# Patient Record
Sex: FEMALE
[De-identification: names, ages, dates, MRNs, and addresses within clinical notes are randomized per-mention and may not be internally consistent; named-entity substitution may affect disease eponyms.]

---

## 2017-06-23 ENCOUNTER — RESULT REVIEW (OUTPATIENT)
Age: 67
End: 2017-06-23

## 2017-10-11 ENCOUNTER — RX RENEWAL (OUTPATIENT)
Age: 67
End: 2017-10-11

## 2017-10-13 ENCOUNTER — MEDICATION RENEWAL (OUTPATIENT)
Age: 67
End: 2017-10-13

## 2017-11-08 ENCOUNTER — RX RENEWAL (OUTPATIENT)
Age: 67
End: 2017-11-08

## 2017-11-14 ENCOUNTER — APPOINTMENT (OUTPATIENT)
Dept: CARDIOLOGY | Facility: CLINIC | Age: 67
End: 2017-11-14

## 2018-01-22 ENCOUNTER — RX RENEWAL (OUTPATIENT)
Age: 68
End: 2018-01-22

## 2019-08-29 ENCOUNTER — RECORD ABSTRACTING (OUTPATIENT)
Age: 69
End: 2019-08-29

## 2019-08-29 DIAGNOSIS — Z82.62 FAMILY HISTORY OF OSTEOPOROSIS: ICD-10-CM

## 2019-08-29 DIAGNOSIS — Z87.39 PERSONAL HISTORY OF OTHER DISEASES OF THE MUSCULOSKELETAL SYSTEM AND CONNECTIVE TISSUE: ICD-10-CM

## 2019-08-29 DIAGNOSIS — Z83.3 FAMILY HISTORY OF DIABETES MELLITUS: ICD-10-CM

## 2019-08-29 DIAGNOSIS — Z92.89 PERSONAL HISTORY OF OTHER MEDICAL TREATMENT: ICD-10-CM

## 2019-08-29 DIAGNOSIS — N95.1 MENOPAUSAL AND FEMALE CLIMACTERIC STATES: ICD-10-CM

## 2019-08-29 DIAGNOSIS — Z82.49 FAMILY HISTORY OF ISCHEMIC HEART DISEASE AND OTHER DISEASES OF THE CIRCULATORY SYSTEM: ICD-10-CM

## 2019-08-29 DIAGNOSIS — Z80.9 FAMILY HISTORY OF MALIGNANT NEOPLASM, UNSPECIFIED: ICD-10-CM

## 2019-08-29 DIAGNOSIS — N60.19 DIFFUSE CYSTIC MASTOPATHY OF UNSPECIFIED BREAST: ICD-10-CM

## 2019-08-29 DIAGNOSIS — F32.9 MAJOR DEPRESSIVE DISORDER, SINGLE EPISODE, UNSPECIFIED: ICD-10-CM

## 2019-08-29 DIAGNOSIS — M79.18 MYALGIA, OTHER SITE: ICD-10-CM

## 2019-08-29 DIAGNOSIS — Z80.49 FAMILY HISTORY OF MALIGNANT NEOPLASM OF OTHER GENITAL ORGANS: ICD-10-CM

## 2019-08-29 DIAGNOSIS — Z80.41 FAMILY HISTORY OF MALIGNANT NEOPLASM OF OVARY: ICD-10-CM

## 2019-08-29 DIAGNOSIS — F41.9 ANXIETY DISORDER, UNSPECIFIED: ICD-10-CM

## 2019-08-29 LAB — CYTOLOGY CVX/VAG DOC THIN PREP: NORMAL

## 2019-09-06 ENCOUNTER — APPOINTMENT (OUTPATIENT)
Dept: OBGYN | Facility: CLINIC | Age: 69
End: 2019-09-06

## 2019-09-16 ENCOUNTER — APPOINTMENT (OUTPATIENT)
Dept: OBGYN | Facility: CLINIC | Age: 69
End: 2019-09-16
Payer: COMMERCIAL

## 2019-09-16 VITALS
DIASTOLIC BLOOD PRESSURE: 68 MMHG | BODY MASS INDEX: 17.89 KG/M2 | HEIGHT: 67 IN | WEIGHT: 114 LBS | SYSTOLIC BLOOD PRESSURE: 120 MMHG

## 2019-09-16 DIAGNOSIS — Z78.9 OTHER SPECIFIED HEALTH STATUS: ICD-10-CM

## 2019-09-16 DIAGNOSIS — Z01.419 ENCOUNTER FOR GYNECOLOGICAL EXAMINATION (GENERAL) (ROUTINE) W/OUT ABNORMAL FINDINGS: ICD-10-CM

## 2019-09-16 PROCEDURE — 99397 PER PM REEVAL EST PAT 65+ YR: CPT

## 2019-09-16 RX ORDER — ALPRAZOLAM 0.5 MG/1
0.5 TABLET, EXTENDED RELEASE ORAL
Refills: 0 | Status: DISCONTINUED | COMMUNITY
End: 2019-09-16

## 2019-09-16 RX ORDER — ROSUVASTATIN CALCIUM 20 MG/1
20 TABLET, FILM COATED ORAL
Refills: 0 | Status: DISCONTINUED | COMMUNITY
End: 2019-09-16

## 2019-09-16 RX ORDER — ALPRAZOLAM 1 MG/1
1 TABLET ORAL
Refills: 0 | Status: DISCONTINUED | COMMUNITY
End: 2019-09-16

## 2019-09-16 NOTE — HISTORY OF PRESENT ILLNESS
[Last Mammogram ___] : Last Mammogram was [unfilled] [Last Bone Density ___] : Last bone density studies [unfilled] [Last Pap ___] : Last cervical pap smear was [unfilled] [Sexually Active] : is sexually active [Male ___] : [unfilled] male

## 2019-09-17 LAB — HPV HIGH+LOW RISK DNA PNL CVX: NOT DETECTED

## 2019-09-22 LAB — CYTOLOGY CVX/VAG DOC THIN PREP: ABNORMAL

## 2020-10-26 ENCOUNTER — APPOINTMENT (OUTPATIENT)
Dept: OBGYN | Facility: CLINIC | Age: 70
End: 2020-10-26
Payer: COMMERCIAL

## 2020-10-26 VITALS
SYSTOLIC BLOOD PRESSURE: 120 MMHG | TEMPERATURE: 98.6 F | DIASTOLIC BLOOD PRESSURE: 70 MMHG | BODY MASS INDEX: 18.36 KG/M2 | HEIGHT: 67 IN | WEIGHT: 117 LBS

## 2020-10-26 DIAGNOSIS — I10 ESSENTIAL (PRIMARY) HYPERTENSION: ICD-10-CM

## 2020-10-26 DIAGNOSIS — N95.2 POSTMENOPAUSAL ATROPHIC VAGINITIS: ICD-10-CM

## 2020-10-26 PROCEDURE — 82270 OCCULT BLOOD FECES: CPT

## 2020-10-26 PROCEDURE — 99072 ADDL SUPL MATRL&STAF TM PHE: CPT

## 2020-10-26 PROCEDURE — 99397 PER PM REEVAL EST PAT 65+ YR: CPT

## 2020-11-02 NOTE — PHYSICAL EXAM
[Appropriately responsive] : appropriately responsive [Alert] : alert [No Acute Distress] : no acute distress [No Lymphadenopathy] : no lymphadenopathy [Regular Rate Rhythm] : regular rate rhythm [No Murmurs] : no murmurs [Clear to Auscultation B/L] : clear to auscultation bilaterally [Soft] : soft [Non-tender] : non-tender [Non-distended] : non-distended [No HSM] : No HSM [No Lesions] : no lesions [No Mass] : no mass [Oriented x3] : oriented x3 [Examination Of The Breasts] : a normal appearance [No Masses] : no breast masses were palpable [Labia Majora] : normal [Labia Minora] : normal [Atrophy] : atrophy [Dry Mucosa] : dry mucosa [Normal] : normal [Uterine Adnexae] : normal [No Tenderness] : no tenderness [Nl Sphincter Tone] : normal sphincter tone [FreeTextEntry9] : Heme negative

## 2020-11-02 NOTE — PLAN
[FreeTextEntry1] : During this visit comprehensive counseling was given regarding the following concerns:\par \par 1 We discussed the need for proper nutrition and exercise.  This includes cardiovascular and pelvic floor exercise.\par \par 2 We discussed the importance of maintaining a proper vaccination schedule and we discussed the utility of the flu vaccine and TDaP.\par \par 3 We discussed the impact of chronic sun exposure and the risk for skin disease as a result. The benefits of a total body scan by a Dermatologist was reviewed..\par \par 4 We discussed the need for certain supplements for most people which include vitamin D3, calcium rich foods with limitation on calcium supplementation by tabular form to 600mg by mouth daily.  As part of a bone health program we recommend weightbearing exercises, and some limited sun exposure ( 10-15 minutes daily) to help convert vitamin D to its active form.\par

## 2020-12-07 LAB
CYTOLOGY CVX/VAG DOC THIN PREP: ABNORMAL
DATE COLLECTED: NORMAL
HEMOCCULT SP1 STL QL: NEGATIVE
QUALITY CONTROL: YES

## 2020-12-21 PROBLEM — Z01.419 ENCOUNTER FOR GYNECOLOGICAL EXAMINATION (GENERAL) (ROUTINE) WITHOUT ABNORMAL FINDINGS: Status: RESOLVED | Noted: 2019-09-16 | Resolved: 2020-12-21

## 2021-05-12 NOTE — HISTORY OF PRESENT ILLNESS
[facility]  [facility] [place] 1944[facility] 65 [facility]  [facility] MRN: [id number]  Unit/Bed#: W -01 Encounter: [id number]  Primary Care Provider: Carmenza Jackson   Date and time admitted to hospital: 5/10/2021  4:49 PM    * Right lower quadrant abdominal pain  Assessment & Plan  · Presentation: worsening RLQ abdominal pain with radiation to right side  Recently completed course of Levaquin and Flagyl for suspected diverticulitis  Pain worsens with PO intake  Also reporting bloating, loose/light colored stools  Denies bleeding  Some occasional nausea  · Unclear etiology, suspected remnants from improving recent diverticulitis  · CT abdomen pelvis without contrast: diverticulosis without evidence of diverticulitis  Asymmetric soft tissue thickening at the right lower anterior abdominal wall, stable since at least 2017  · UA negative  · Lipase normal   · Lactic normal  · Prior colonoscopy is not available for review  · Currently feels much improved  · Tolerating clear liquid diet  · Requested diet advancement for lunch with hopes of discharge   · Patient tolerated lunch without nausea, vomiting, diarrhea or pain   · Reports improvement in abdominal bloating with Simethicone x1   · Will discharge home with PPI BID and Simethicone PRN  · Remain well hydrated and follow a lite/ bland diet     Hypertension  Assessment & Plan  · BP elevated in setting of acute pain  · Resumed lisinopril  · Follow-up with PCP    PUD (peptic ulcer disease)  Assessment & Plan  · S/p EGD 07/2020 in setting of coffee ground emesis  Negative for H pylori  Treated with PPI BID and Carafate QID  Per note, patient recommended for repeat EGD around Oct 2020  · Remains on protonix BID  · May follow as outpatient  [facility]  Assessment & Plan  Lab Results   Component Value Date    HGBA1C 7 3 (H) 05/10/2021       No results for input(s): POCGLU in the last 72 hours      Blood [Menarche Age: ____] : age at menarche was [unfilled] Sugar Average: Last 72 hrs:  · Resume Metformin on discharge  · A1c 7 3      Degenerative lumbar spinal stenosis  Assessment & Plan  · Percocet  mg Q6 PRN, PDMP reviewed  · Substituted with oxycodone 5 mg q6 PRN moderate, oxycodone 10 mg q6 PRN severe as home regimen is nonformulary  Diarrhea-resolved as of 5/12/2021  Assessment & Plan  · POA   · No further diarrhea since admission, unable to check cdiff and bacterial stool panel        Discharging Physician / Practitioner: NANY Tucker  PCP: Deisy Toribio [address] [place] [address]  Admission Date:   Admission Orders (From admission, onward)     Ordered        05/10/21 4886  Place in Observation  Once                   Discharge Date: 05/12/21    Resolved Problems  Date Reviewed: 5/12/2021          Resolved    Diarrhea 5/12/2021     Resolved by  Luda George [address] [place] [address] Stay:  · None    Procedures Performed:   · CT A/P: No acute findings in the abdomen and pelvis  Significant Findings / Test Results:   · None     Incidental Findings:   · None     Test Results Pending at Discharge (will require follow up): · None      Outpatient Tests Requested:  · None    Complications:  None    Reason for Admission: Abdominal pain     Hospital Course: Maty Joseph is a 65 y o  female patient with a PMH including cholecystectomy, partial hysterectomy, appendectomy, PUD, DM, HTN, DJD who originally presented to the hospital on 5/10/2021 due to RLQ abdominal pain  Patient recently treated for right-sided diverticulitis and had completed her antibiotic course  States right lower quadrant pain persisted which prompted her to come to the emergency department  CT of the abdomen pelvis showed no acute finding  Patient was admitted for further evaluation and treatment  Unclear etiology of abdominal pain but suspect remnants from improving recent diverticulitis  No signs of active infection    Patient was treated with conservative [Currently Active] : currently active [Men] : men management with improvement  She tolerated clear liquids for which her diet was advanced to surgical soft  Patient ate 100% of lunch without nausea, vomiting, or diarrhea  Patient reported abdominal bloating which improved after simethicone  Patient desires discharge home  We will continue Reglan and simethicone as needed  She is to remain well hydrated follow-up bland diet  She is to follow-up with her PCP  Please see above list of diagnoses and related plan for additional information  Condition at Discharge: stable     Discharge Day Visit / Exam:     Subjective:  Patient tolerated clear liquids for breakfast and requested diet advancement for lunch  Patient ate 100% of lunch  No nausea, vomiting, or diarrhea  No chest pain or shortness of breath  Felt improvement in bloating after Simethicone  Would like to discharge home with Simethicone  Vitals: Blood Pressure: 169/76 (05/12/21 0727)  Pulse: 98 (05/12/21 0727)  Temperature: (!) 97 4 °F (36 3 °C) (05/12/21 0727)  Temp Source: Oral (05/12/21 0727)  Respirations: 17 (05/12/21 0727)  Height: 5' (152 4 cm) (05/11/21 1031)  Weight - Scale: 84 6 kg (186 lb 8 2 oz) (05/11/21 1031)  SpO2: 98 % (05/12/21 0727)  Exam:   Physical Exam  Vitals signs and nursing note reviewed  Constitutional:       General: She is not in acute distress  Appearance: She is well-developed  She is obese  She is not toxic-appearing or diaphoretic  HENT:      Head: Normocephalic and atraumatic  Eyes:      Conjunctiva/sclera: Conjunctivae normal    Neck:      Musculoskeletal: Normal range of motion  Cardiovascular:      Rate and Rhythm: Normal rate  Pulmonary:      Effort: Pulmonary effort is normal  No respiratory distress  Breath sounds: Normal breath sounds  No wheezing, rhonchi or rales  Abdominal:      General: Bowel sounds are normal  There is no distension  Palpations: Abdomen is soft  Tenderness: There is no abdominal tenderness   There is no [Vaginal] : vaginal right CVA tenderness, left CVA tenderness, guarding or rebound  Musculoskeletal: Normal range of motion  Right lower leg: No edema  Left lower leg: No edema  Skin:     General: Skin is warm and dry  Capillary Refill: Capillary refill takes less than 2 seconds  Neurological:      Mental Status: She is alert and oriented to person, place, and time  Mental status is at baseline  Psychiatric:         Mood and Affect: Mood normal          Behavior: Behavior normal          Thought Content: Thought content normal          Judgment: Judgment normal          Discussion with Family:  Offered to call family but patient declined this stating she called them    Discharge instructions/Information to patient and family:   See after visit summary for information provided to patient and family  Provisions for Follow-Up Care:  See after visit summary for information related to follow-up care and any pertinent home health orders  Disposition:     Home    For Discharges to [facility]F:   · Not Applicable to this Patient - Not Applicable to this Patient    Planned Readmission: None     Discharge Statement:  I spent > 30 minutes discharging the patient  This time was spent on the day of discharge  I had direct contact with the patient on the day of discharge  Greater than 50% of the total time was spent examining patient, answering all patient questions, arranging and discussing plan of care with patient as well as directly providing post-discharge instructions  Additional time then spent on discharge activities  Discharge Medications:  See after visit summary for reconciled discharge medications provided to patient and family        ** Please Note: This note has been constructed using a voice recognition system ** [No] : No [LMP unknown] : LMP unknown [postmenopausal] : postmenopausal [N] : Patient does not use contraception [Y] : Patient is sexually active [FreeTextEntry1] : Vonda is here her annual exam and she had her flu, pneumonia and shingles vaccine at her PCP. She is followed for her Osteoporosis and she is receiving Prolia infusions.  [Mammogramdate] : 9/23/2019  [TextBox_19] : BR1  [BreastSonogramDate] : 9/23/2019  [TextBox_25] : BR1  [PapSmeardate] : 9/16/2019  [TextBox_31] : WNL  [BoneDensityDate] : 8/2020  [TextBox_37] : @ dr harrell  [ColonoscopyDate] : 1/2020  [PGHxTotal] : 3 [[facility] : [facility] [[facility]] : 3 [TextBox_9] : 13

## 2021-11-08 ENCOUNTER — APPOINTMENT (OUTPATIENT)
Dept: OBGYN | Facility: CLINIC | Age: 71
End: 2021-11-08
Payer: COMMERCIAL

## 2021-11-08 ENCOUNTER — NON-APPOINTMENT (OUTPATIENT)
Age: 71
End: 2021-11-08

## 2021-11-08 VITALS
DIASTOLIC BLOOD PRESSURE: 72 MMHG | SYSTOLIC BLOOD PRESSURE: 132 MMHG | HEIGHT: 67 IN | WEIGHT: 107 LBS | BODY MASS INDEX: 16.79 KG/M2

## 2021-11-08 PROCEDURE — 82270 OCCULT BLOOD FECES: CPT

## 2021-11-08 PROCEDURE — 99397 PER PM REEVAL EST PAT 65+ YR: CPT

## 2021-11-08 RX ORDER — CARBAMAZEPINE 200 MG/1
200 TABLET ORAL
Refills: 0 | Status: DISCONTINUED | COMMUNITY
End: 2021-11-08

## 2021-12-05 LAB
DATE COLLECTED: NORMAL
HEMOCCULT SP1 STL QL: NEGATIVE
QUALITY CONTROL: YES

## 2021-12-05 NOTE — HISTORY OF PRESENT ILLNESS
[Patient reported PAP Smear was normal] : Patient reported PAP Smear was normal [HPV test offered] : HPV test offered [LMP unknown] : LMP unknown [postmenopausal] : postmenopausal [N] : Patient does not use contraception [Monogamous (Male Partner)] : is monogamous with a male partner [Y] : Positive pregnancy history [unknown] : Patient is unsure of the date of her LMP [Menarche Age: ____] : age at menarche was [unfilled] [Post-Menopause, No Sxs] : post-menopausal, currently without symptoms [Currently Active] : currently active [Men] : men [Vaginal] : vaginal [No] : No [Patient refuses STI testing] : Patient refuses STI testing [FreeTextEntry1] : GABY presents today for an annual exam. She is doing well with no gynecological complaints. [Mammogramdate] : 11.18.20 [TextBox_19] : BR1 [BreastSonogramDate] : 09.23.19 [TextBox_25] : BR1 [PapSmeardate] : 10.26.20 [BoneDensityDate] : 08.2020 [TextBox_37] : AS PER PATIENT  [ColonoscopyDate] : 01/2020 [TextBox_43] : AS PER PATIENT  [HPVDate] : 09.16.19 [TextBox_78] : NEG [PGHxTotal] : 3 [[facility]] : 3 [[facility]] : 3

## 2021-12-05 NOTE — END OF VISIT
[FreeTextEntry3] : I, [Miky Mcneal] solely acted as scribe for Dr. Rasta Block on 11/08/2021.\par All medical entries made by the Scribe were at my, Dr. Block’s, direction and personally\par dictated by me on 11/08/2021.  I have reviewed the chart and agree that the record\par accurately reflects my personal performance of the history, physical exam, assessment and plan. I\par have also personally directed, reviewed, and agreed with the chart.

## 2021-12-05 NOTE — DISCUSSION/SUMMARY
[FreeTextEntry1] : During this visit comprehensive counseling was given regarding the following concerns:\par 1 We discussed the need for proper nutrition and exercise. This includes cardiovascular and\par pelvic floor exercise.\par 2 We discussed the importance of maintaining a proper vaccination schedule and we discussed\par the utility of the flu vaccine and TDaP.\par 3 We discussed the impact of chronic sun exposure and the risk for skin disease as a result. The\par benefits of a total body scan by a Dermatologist was reviewed..\par 4 We discussed the need for certain supplements for most people which include vitamin D3,\par calcium rich foods with limitation on calcium supplementation by tabular form to 600 mg by\par mouth daily. As part of a bone health program we recommend weight bearing exercises, and\par some limited sun exposure (10-15 minutes daily) to help convert vitamin D to its active form.\par \par Prescription for mammogram screening given. \par \par During this visit 20 minutes were spent face-to-face with greater than 50% of the time dedicated\par to counseling.

## 2022-01-18 ENCOUNTER — NON-APPOINTMENT (OUTPATIENT)
Age: 72
End: 2022-01-18

## 2022-01-18 ENCOUNTER — APPOINTMENT (OUTPATIENT)
Dept: OPHTHALMOLOGY | Facility: CLINIC | Age: 72
End: 2022-01-18
Payer: COMMERCIAL

## 2022-01-18 PROCEDURE — 92250 FUNDUS PHOTOGRAPHY W/I&R: CPT

## 2022-01-18 PROCEDURE — 92014 COMPRE OPH EXAM EST PT 1/>: CPT

## 2022-04-07 ENCOUNTER — NON-APPOINTMENT (OUTPATIENT)
Age: 72
End: 2022-04-07

## 2022-04-07 ENCOUNTER — APPOINTMENT (OUTPATIENT)
Dept: OPHTHALMOLOGY | Facility: CLINIC | Age: 72
End: 2022-04-07
Payer: COMMERCIAL

## 2022-04-07 PROCEDURE — 92014 COMPRE OPH EXAM EST PT 1/>: CPT

## 2022-11-09 ENCOUNTER — APPOINTMENT (OUTPATIENT)
Dept: OBGYN | Facility: CLINIC | Age: 72
End: 2022-11-09

## 2022-11-09 ENCOUNTER — NON-APPOINTMENT (OUTPATIENT)
Age: 72
End: 2022-11-09

## 2022-11-09 VITALS
HEIGHT: 67 IN | BODY MASS INDEX: 16.95 KG/M2 | DIASTOLIC BLOOD PRESSURE: 70 MMHG | SYSTOLIC BLOOD PRESSURE: 130 MMHG | WEIGHT: 108 LBS

## 2022-11-09 DIAGNOSIS — Z12.4 ENCOUNTER FOR SCREENING FOR MALIGNANT NEOPLASM OF CERVIX: ICD-10-CM

## 2022-11-09 DIAGNOSIS — Z12.12 ENCOUNTER FOR SCREENING FOR MALIGNANT NEOPLASM OF RECTUM: ICD-10-CM

## 2022-11-09 DIAGNOSIS — Z12.31 ENCOUNTER FOR SCREENING MAMMOGRAM FOR MALIGNANT NEOPLASM OF BREAST: ICD-10-CM

## 2022-11-09 DIAGNOSIS — Z92.89 PERSONAL HISTORY OF OTHER MEDICAL TREATMENT: ICD-10-CM

## 2022-11-09 PROCEDURE — 82270 OCCULT BLOOD FECES: CPT

## 2022-11-09 PROCEDURE — 99397 PER PM REEVAL EST PAT 65+ YR: CPT

## 2022-11-10 LAB
DATE COLLECTED: NORMAL
HEMOCCULT SP1 STL QL: NEGATIVE
QUALITY CONTROL: YES

## 2022-11-11 LAB — HPV HIGH+LOW RISK DNA PNL CVX: NOT DETECTED

## 2022-11-12 PROBLEM — Z12.4 CERVICAL CANCER SCREENING: Status: RESOLVED | Noted: 2019-09-16 | Resolved: 2022-11-12

## 2022-11-12 NOTE — PHYSICAL EXAM
[Chaperone Present] : A chaperone was present in the examining room during all aspects of the physical examination [Appropriately responsive] : appropriately responsive [Alert] : alert [No Acute Distress] : no acute distress [Soft] : soft [Non-tender] : non-tender [Non-distended] : non-distended [Oriented x3] : oriented x3 [Examination Of The Breasts] : a normal appearance [No Discharge] : no discharge [No Masses] : no breast masses were palpable [Labia Majora] : normal [Labia Minora] : normal [No Bleeding] : There was no active vaginal bleeding [Normal] : normal [Uterine Adnexae] : normal [FreeTextEntry1] : MOA: Ana WATSON  [Atrophy] : atrophy [Dry Mucosa] : dry mucosa [FreeTextEntry9] : Stool for occult blood.

## 2022-11-12 NOTE — END OF VISIT
[FreeTextEntry3] : I, Fredis Bolaños solely acted as scribe for Dr. Tabitha Conrad on 11/09/2022 \par All medical entries made by the Scribe were at my, Dr. Conrad’s, direction and personally\par dictated by me on 11/09/2022 . I have reviewed the chart and agree that the record\par accurately reflects my personal performance of the history, physical exam, assessment and plan. I\par have also personally directed, reviewed, and agreed with the chart.

## 2022-11-12 NOTE — HISTORY OF PRESENT ILLNESS
[postmenopausal] : postmenopausal [N] : Patient does not use contraception [Y] : Positive pregnancy history [Menarche Age: ____] : age at menarche was [unfilled] [Mammogramdate] : 11/23/2021 [TextBox_19] : B1 [PapSmeardate] : 10/26/2020 [TextBox_31] : WNL [LMPDate] :  [PGHxTotal] : 3 [[facility]Term] : 3 [[facility]] : 3 [FreeTextEntry1] :

## 2022-11-12 NOTE — DISCUSSION/SUMMARY
[FreeTextEntry1] : Benign breast and pelvic exam. Pap done today. If pap normal, will stop yearly paps. Stool for occult blood was negative. \par \par Prescription for mammogram screening given.\par \par Self-breast exam reviewed.\par \par She will follow up annually and as needed.\par

## 2022-11-15 LAB — CYTOLOGY CVX/VAG DOC THIN PREP: ABNORMAL

## 2023-01-18 ENCOUNTER — NON-APPOINTMENT (OUTPATIENT)
Age: 73
End: 2023-01-18

## 2023-01-18 ENCOUNTER — APPOINTMENT (OUTPATIENT)
Dept: OPHTHALMOLOGY | Facility: CLINIC | Age: 73
End: 2023-01-18
Payer: MEDICARE

## 2023-01-18 PROCEDURE — 92012 INTRM OPH EXAM EST PATIENT: CPT

## 2023-01-18 PROCEDURE — 92133 CPTRZD OPH DX IMG PST SGM ON: CPT

## 2023-11-15 ENCOUNTER — APPOINTMENT (OUTPATIENT)
Dept: OBGYN | Facility: CLINIC | Age: 73
End: 2023-11-15
Payer: MEDICARE

## 2023-11-15 VITALS
DIASTOLIC BLOOD PRESSURE: 70 MMHG | WEIGHT: 113 LBS | SYSTOLIC BLOOD PRESSURE: 118 MMHG | BODY MASS INDEX: 17.74 KG/M2 | HEIGHT: 67 IN

## 2023-11-15 DIAGNOSIS — Z12.11 ENCOUNTER FOR SCREENING FOR MALIGNANT NEOPLASM OF COLON: ICD-10-CM

## 2023-11-15 DIAGNOSIS — Z12.31 ENCOUNTER FOR SCREENING MAMMOGRAM FOR MALIGNANT NEOPLASM OF BREAST: ICD-10-CM

## 2023-11-15 DIAGNOSIS — Z01.419 ENCOUNTER FOR GYNECOLOGICAL EXAMINATION (GENERAL) (ROUTINE) W/OUT ABNORMAL FINDINGS: ICD-10-CM

## 2023-11-15 DIAGNOSIS — R92.30 DENSE BREASTS, UNSPECIFIED: ICD-10-CM

## 2023-11-15 PROCEDURE — G0101: CPT

## 2023-11-15 RX ORDER — CARBAMAZEPINE 400 MG/1
400 TABLET, EXTENDED RELEASE ORAL
Refills: 0 | Status: ACTIVE | COMMUNITY

## 2023-11-15 RX ORDER — PSYLLIUM HUSK 0.4 G
CAPSULE ORAL
Refills: 0 | Status: ACTIVE | COMMUNITY

## 2023-11-15 RX ORDER — DENOSUMAB 60 MG/ML
60 INJECTION SUBCUTANEOUS
Refills: 0 | Status: ACTIVE | COMMUNITY

## 2024-01-17 ENCOUNTER — NON-APPOINTMENT (OUTPATIENT)
Age: 74
End: 2024-01-17

## 2024-01-17 ENCOUNTER — APPOINTMENT (OUTPATIENT)
Dept: OPHTHALMOLOGY | Facility: CLINIC | Age: 74
End: 2024-01-17
Payer: MEDICARE

## 2024-01-17 PROCEDURE — 92014 COMPRE OPH EXAM EST PT 1/>: CPT

## 2024-01-17 PROCEDURE — 92133 CPTRZD OPH DX IMG PST SGM ON: CPT

## 2024-11-26 ENCOUNTER — APPOINTMENT (OUTPATIENT)
Dept: OBGYN | Facility: CLINIC | Age: 74
End: 2024-11-26
Payer: MEDICARE

## 2024-11-26 VITALS
HEIGHT: 67 IN | WEIGHT: 111.13 LBS | SYSTOLIC BLOOD PRESSURE: 142 MMHG | BODY MASS INDEX: 17.44 KG/M2 | DIASTOLIC BLOOD PRESSURE: 66 MMHG

## 2024-11-26 DIAGNOSIS — Z12.31 ENCOUNTER FOR SCREENING MAMMOGRAM FOR MALIGNANT NEOPLASM OF BREAST: ICD-10-CM

## 2024-11-26 DIAGNOSIS — Z01.419 ENCOUNTER FOR GYNECOLOGICAL EXAMINATION (GENERAL) (ROUTINE) W/OUT ABNORMAL FINDINGS: ICD-10-CM

## 2024-11-26 DIAGNOSIS — N95.2 POSTMENOPAUSAL ATROPHIC VAGINITIS: ICD-10-CM

## 2024-11-26 PROCEDURE — 99213 OFFICE O/P EST LOW 20 MIN: CPT

## 2024-11-26 PROCEDURE — 99459 PELVIC EXAMINATION: CPT

## 2024-11-26 RX ORDER — DOCUSATE SODIUM 100 MG/1
CAPSULE ORAL
Refills: 0 | Status: ACTIVE | COMMUNITY

## 2025-01-16 ENCOUNTER — APPOINTMENT (OUTPATIENT)
Dept: OPHTHALMOLOGY | Facility: CLINIC | Age: 75
End: 2025-01-16
Payer: MEDICARE

## 2025-01-16 ENCOUNTER — NON-APPOINTMENT (OUTPATIENT)
Age: 75
End: 2025-01-16

## 2025-01-16 PROCEDURE — 92014 COMPRE OPH EXAM EST PT 1/>: CPT
